# Patient Record
Sex: FEMALE | Race: OTHER | Employment: OTHER | URBAN - METROPOLITAN AREA
[De-identification: names, ages, dates, MRNs, and addresses within clinical notes are randomized per-mention and may not be internally consistent; named-entity substitution may affect disease eponyms.]

---

## 2023-09-13 ENCOUNTER — NEW PATIENT (OUTPATIENT)
Dept: URBAN - METROPOLITAN AREA CLINIC 32 | Facility: CLINIC | Age: 73
End: 2023-09-13

## 2023-09-13 DIAGNOSIS — H43.813: ICD-10-CM

## 2023-09-13 DIAGNOSIS — H25.13: ICD-10-CM

## 2023-09-13 DIAGNOSIS — H16.223: ICD-10-CM

## 2023-09-13 PROCEDURE — 92015 DETERMINE REFRACTIVE STATE: CPT

## 2023-09-13 PROCEDURE — 99204 OFFICE O/P NEW MOD 45 MIN: CPT

## 2023-09-13 ASSESSMENT — VISUAL ACUITY
OS_SC: J1-2
OS_CC: 20/40+2
OD_CC: 20/60+2
OS_GLARE: 20/60
OD_SC: J1+
OD_GLARE: 20/40
OS_SC: CF 6FT
OS_CC: J2
OD_CC: J2+2
OD_SC: 20/400
OD_PH: 20/30-2

## 2023-09-13 ASSESSMENT — KERATOMETRY
OS_AXISANGLE_DEGREES: 2
OS_K2POWER_DIOPTERS: 44.25
OD_K1POWER_DIOPTERS: 45.25
OD_AXISANGLE2_DEGREES: 94
OS_AXISANGLE2_DEGREES: 92
OD_AXISANGLE_DEGREES: 4
OS_K1POWER_DIOPTERS: 45.25
OD_K2POWER_DIOPTERS: 44.5

## 2023-09-13 ASSESSMENT — TONOMETRY
OS_IOP_MMHG: 12
OD_IOP_MMHG: 13

## 2024-02-12 ENCOUNTER — OV NP (OUTPATIENT)
Dept: URBAN - METROPOLITAN AREA CLINIC 68 | Facility: CLINIC | Age: 74
End: 2024-02-12
Payer: COMMERCIAL

## 2024-02-12 ENCOUNTER — LAB (OUTPATIENT)
Dept: URBAN - METROPOLITAN AREA CLINIC 68 | Facility: CLINIC | Age: 74
End: 2024-02-12

## 2024-02-12 VITALS
WEIGHT: 155 LBS | OXYGEN SATURATION: 98 % | HEIGHT: 63 IN | HEART RATE: 66 BPM | BODY MASS INDEX: 27.46 KG/M2 | DIASTOLIC BLOOD PRESSURE: 80 MMHG | TEMPERATURE: 97.8 F | SYSTOLIC BLOOD PRESSURE: 122 MMHG

## 2024-02-12 DIAGNOSIS — K21.9 CHRONIC GERD: ICD-10-CM

## 2024-02-12 DIAGNOSIS — R11.14 BILIOUS VOMITING WITH NAUSEA: ICD-10-CM

## 2024-02-12 DIAGNOSIS — R11.0 NAUSEA: ICD-10-CM

## 2024-02-12 DIAGNOSIS — Z86.010 HISTORY OF COLON POLYPS: ICD-10-CM

## 2024-02-12 DIAGNOSIS — R10.31 RLQ ABDOMINAL PAIN: ICD-10-CM

## 2024-02-12 DIAGNOSIS — K59.09 CHRONIC CONSTIPATION: ICD-10-CM

## 2024-02-12 PROCEDURE — 99204 OFFICE O/P NEW MOD 45 MIN: CPT | Performed by: SPECIALIST

## 2024-02-12 RX ORDER — OMEPRAZOLE 40 MG/1
TAKE 1 CAPSULE (40 MG TOTAL) BY MOUTH DAILY CAPSULE, DELAYED RELEASE ORAL
OUTPATIENT

## 2024-02-12 RX ORDER — ATORVASTATIN CALCIUM 20 MG/1
TAKE 1 TABLET BY MOUTH EVERY DAY IN THE EVENING TABLET, FILM COATED ORAL
Qty: 90 EACH | Refills: 1 | Status: ACTIVE | COMMUNITY

## 2024-02-12 RX ORDER — CLOBETASOL PROPIONATE 0.5 MG/G
APPLY TOPICALLY 2 (TWO) TIMES A WEEK APPLY SPARINGLY TO VAGINAL OPENING OINTMENT TOPICAL
Qty: 60 GRAM | Refills: 0 | Status: ACTIVE | COMMUNITY

## 2024-02-12 RX ORDER — CLORAZEPATE DIPOTASSIUM 3.75 MG/1
TAKE 1 TABLET BY MOUTH 2 TIMES A DAY AS NEEDED FOR ANXIETY TABLET ORAL
Qty: 20 EACH | Refills: 0 | Status: ACTIVE | COMMUNITY

## 2024-02-12 RX ORDER — OMEPRAZOLE 40 MG/1
TAKE 1 CAPSULE (40 MG TOTAL) BY MOUTH DAILY CAPSULE, DELAYED RELEASE ORAL
Qty: 90 EACH | Refills: 0 | Status: ACTIVE | COMMUNITY

## 2024-02-12 NOTE — PHYSICAL EXAM NECK/THYROID:
normal appearance , without tenderness upon palpation , no deformities , trachea midline , Thyroid normal size , no masses , thyroid nontender
Xray Chest 1 View- PORTABLE-Urgent

## 2024-02-12 NOTE — HPI-TODAY'S VISIT:
Chronic nausea at times and is due for colon cancer surveillence,  had history colon polyps  chronic Gerdv but recent vomiting and symptoms suggest chronid GERD possibel Poor LES or other complications   Needs egd and special prep pt did not tolerate well in the past

## 2024-02-27 ENCOUNTER — COL/EGD (OUTPATIENT)
Dept: URBAN - METROPOLITAN AREA SURGERY CENTER 12 | Facility: SURGERY CENTER | Age: 74
End: 2024-02-27
Payer: COMMERCIAL

## 2024-02-27 DIAGNOSIS — Z86.010 PERSONAL HISTORY OF COLONIC POLYPS: ICD-10-CM

## 2024-02-27 DIAGNOSIS — K63.5 POLYP OF SIGMOID COLON, UNSPECIFIED TYPE: ICD-10-CM

## 2024-02-27 DIAGNOSIS — K31.89 OTHER DISEASES OF STOMACH AND DUODENUM: ICD-10-CM

## 2024-02-27 DIAGNOSIS — K64.8 OTHER HEMORRHOIDS: ICD-10-CM

## 2024-02-27 PROCEDURE — 43239 EGD BIOPSY SINGLE/MULTIPLE: CPT | Performed by: SPECIALIST

## 2024-02-27 PROCEDURE — 45385 COLONOSCOPY W/LESION REMOVAL: CPT | Performed by: SPECIALIST

## 2024-02-27 RX ORDER — CLOBETASOL PROPIONATE 0.5 MG/G
APPLY TOPICALLY 2 (TWO) TIMES A WEEK APPLY SPARINGLY TO VAGINAL OPENING OINTMENT TOPICAL
Qty: 60 GRAM | Refills: 0 | Status: ACTIVE | COMMUNITY

## 2024-02-27 RX ORDER — ATORVASTATIN CALCIUM 20 MG/1
TAKE 1 TABLET BY MOUTH EVERY DAY IN THE EVENING TABLET, FILM COATED ORAL
Qty: 90 EACH | Refills: 1 | Status: ACTIVE | COMMUNITY

## 2024-02-27 RX ORDER — CLORAZEPATE DIPOTASSIUM 3.75 MG/1
TAKE 1 TABLET BY MOUTH 2 TIMES A DAY AS NEEDED FOR ANXIETY TABLET ORAL
Qty: 20 EACH | Refills: 0 | Status: ACTIVE | COMMUNITY

## 2024-02-27 RX ORDER — OMEPRAZOLE 40 MG/1
TAKE 1 CAPSULE (40 MG TOTAL) BY MOUTH DAILY CAPSULE, DELAYED RELEASE ORAL
Status: ACTIVE | COMMUNITY

## 2024-03-20 ENCOUNTER — OV EP (OUTPATIENT)
Dept: URBAN - METROPOLITAN AREA CLINIC 68 | Facility: CLINIC | Age: 74
End: 2024-03-20
Payer: COMMERCIAL

## 2024-03-20 VITALS
HEIGHT: 63 IN | WEIGHT: 151 LBS | DIASTOLIC BLOOD PRESSURE: 70 MMHG | OXYGEN SATURATION: 98 % | HEART RATE: 83 BPM | SYSTOLIC BLOOD PRESSURE: 118 MMHG | BODY MASS INDEX: 26.75 KG/M2

## 2024-03-20 DIAGNOSIS — Z86.010 HISTORY OF COLON POLYPS: ICD-10-CM

## 2024-03-20 DIAGNOSIS — K21.9 CHRONIC GERD: ICD-10-CM

## 2024-03-20 DIAGNOSIS — K59.09 CHRONIC CONSTIPATION: ICD-10-CM

## 2024-03-20 DIAGNOSIS — R11.0 CHRONIC NAUSEA: ICD-10-CM

## 2024-03-20 PROBLEM — 422587007: Status: ACTIVE | Noted: 2024-03-20

## 2024-03-20 PROBLEM — 236069009: Status: ACTIVE | Noted: 2024-03-20

## 2024-03-20 PROCEDURE — 99213 OFFICE O/P EST LOW 20 MIN: CPT

## 2024-03-20 RX ORDER — ATORVASTATIN CALCIUM 20 MG/1
TAKE 1 TABLET BY MOUTH EVERY DAY IN THE EVENING TABLET, FILM COATED ORAL
Qty: 90 EACH | Refills: 1 | Status: ACTIVE | COMMUNITY

## 2024-03-20 RX ORDER — OMEPRAZOLE 40 MG/1
TAKE 1 CAPSULE (40 MG TOTAL) BY MOUTH DAILY CAPSULE, DELAYED RELEASE ORAL
Status: ACTIVE | COMMUNITY

## 2024-03-20 RX ORDER — CLOBETASOL PROPIONATE 0.5 MG/G
APPLY TOPICALLY 2 (TWO) TIMES A WEEK APPLY SPARINGLY TO VAGINAL OPENING OINTMENT TOPICAL
Qty: 60 GRAM | Refills: 0 | Status: ACTIVE | COMMUNITY

## 2024-03-20 RX ORDER — CLORAZEPATE DIPOTASSIUM 3.75 MG/1
TAKE 1 TABLET BY MOUTH 2 TIMES A DAY AS NEEDED FOR ANXIETY TABLET ORAL
Qty: 20 EACH | Refills: 0 | Status: ACTIVE | COMMUNITY

## 2024-03-20 NOTE — HPI-TODAY'S VISIT:
Patient presents for follow-up s/p EGD and colonoscopy. She describes chronic nausea at times for several years since her 20's. She does have some chornic constipation as well and will have a BM 1-2x times per week without laxative however she does use smooth move tea with some relief. She is currently taking amoxicillin and prednisone due to URI. 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 She is recommended to trial daily Miralax and FDgard and will f/u in 4 weeks. She denies vomiting, dysphagia, abdominal pain, melena, rectal bleeding, diarrhea, weight loss, fever.

## 2024-04-26 ENCOUNTER — OV EP (OUTPATIENT)
Dept: URBAN - METROPOLITAN AREA CLINIC 68 | Facility: CLINIC | Age: 74
End: 2024-04-26